# Patient Record
Sex: FEMALE | Race: WHITE | NOT HISPANIC OR LATINO | Employment: OTHER | URBAN - METROPOLITAN AREA
[De-identification: names, ages, dates, MRNs, and addresses within clinical notes are randomized per-mention and may not be internally consistent; named-entity substitution may affect disease eponyms.]

---

## 2024-05-20 ENCOUNTER — HOSPITAL ENCOUNTER (EMERGENCY)
Facility: HOSPITAL | Age: 84
Discharge: HOME | End: 2024-05-20
Attending: EMERGENCY MEDICINE
Payer: MEDICARE

## 2024-05-20 VITALS
WEIGHT: 159 LBS | BODY MASS INDEX: 30.02 KG/M2 | TEMPERATURE: 99 F | SYSTOLIC BLOOD PRESSURE: 158 MMHG | DIASTOLIC BLOOD PRESSURE: 83 MMHG | OXYGEN SATURATION: 98 % | HEIGHT: 61 IN | RESPIRATION RATE: 15 BRPM | HEART RATE: 72 BPM

## 2024-05-20 DIAGNOSIS — S00.83XA HEMATOMA OF OCCIPITAL SURFACE OF HEAD, INITIAL ENCOUNTER: ICD-10-CM

## 2024-05-20 DIAGNOSIS — S00.03XA SCALP HEMATOMA, INITIAL ENCOUNTER: ICD-10-CM

## 2024-05-20 DIAGNOSIS — Z79.01 LONG TERM CURRENT USE OF ANTICOAGULANT: ICD-10-CM

## 2024-05-20 DIAGNOSIS — S09.90XA MINOR HEAD INJURY WITHOUT LOSS OF CONSCIOUSNESS, INITIAL ENCOUNTER: ICD-10-CM
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
## 2024-05-20 PROCEDURE — 99284 EMERGENCY DEPT VISIT MOD MDM: CPT | Mod: 25

## 2024-05-20 PROCEDURE — G0390 TRAUMA RESPONS W/HOSP CRITI: HCPCS

## 2024-05-21 NOTE — ED PROVIDER NOTES
Encounter Date: 5/20/2024    SCRIBE #1 NOTE: I, Angelia Barry, am scribing for, and in the presence of,  Levi Tam MD. I have scribed the following portions of the note - Other sections scribed: HPI, ROS, PE.       History   No chief complaint on file.    84 year old female with an unknown pmhx presents to the ED via EMS from [facility] for a fall that occurred PTA.  The patient reports pain in the left occipital scalp area.  The patient denies any nausea, vomiting, or pain anywhere else.  Per EMS, the patient did not lose consciousness.  They report that the patient was complaining of pain in the left occipital scalp area en route.  They report that the patient is on Xarelto.  They report that the patient has a GCS of 14 at baseline.  A C-collar was placed upon arrival.    The history is provided by the patient and the EMS personnel. No  was used.     Review of patient's allergies indicates:  No Known Allergies  No past medical history on file.  No past surgical history on file.  No family history on file.     Review of Systems   Constitutional:  Negative for fever.   HENT:  Negative for sore throat.         Pain to the L occipital scalp   Respiratory:  Negative for shortness of breath.    Cardiovascular:  Negative for chest pain.   Gastrointestinal:  Negative for nausea.   Genitourinary:  Negative for dysuria.   Musculoskeletal:  Negative for back pain.   Skin:  Negative for rash.   Neurological:  Negative for weakness.   Hematological:  Does not bruise/bleed easily.       Physical Exam     Initial Vitals   BP Pulse Resp Temp SpO2   05/20/24 1907 05/20/24 1907 05/20/24 1907 05/20/24 1907 05/20/24 1858   (!) 181/93 79 19 98.9 °F (37.2 °C) 99 %      MAP       --                Physical Exam    Nursing note and vitals reviewed.  Constitutional: She appears well-developed and well-nourished. Airway: Normal. Cervical collar in place.   HENT:   Head: Normocephalic.   Right Ear:  External ear normal.   Left Ear: External ear normal.   Hematoma to the left occipital area    Eyes: Conjunctivae and EOM are normal. Pupils are equal, round, and reactive to light.   Cardiovascular:  Normal rate, regular rhythm, normal heart sounds and intact distal pulses.           Pulmonary/Chest: Breath sounds normal.   Abdominal: Abdomen is soft. Bowel sounds are normal.   Musculoskeletal:      Cervical back: No tenderness.     Neurological: She is alert and oriented to person, place, and time. She displays tremor (resting).   Skin: Skin is warm and dry. Capillary refill takes less than 2 seconds.   Psychiatric: She has a normal mood and affect. Her behavior is normal. Judgment and thought content normal.         ED Course   Procedures  Labs Reviewed   CBC W/ AUTO DIFFERENTIAL    Narrative:     The following orders were created for panel order CBC auto differential.  Procedure                               Abnormality         Status                     ---------                               -----------         ------                     CBC with Differential[[id number]]                                                        Please view results for these tests on the individual orders.   COMPREHENSIVE METABOLIC PANEL   PROTIME-INR   APTT   LACTIC ACID, PLASMA   URINALYSIS, REFLEX TO URINE CULTURE   DRUG SCREEN, URINE (BEAKER)   ALCOHOL,MEDICAL (ETHANOL)   CBC WITH DIFFERENTIAL   TYPE & SCREEN          Imaging Results              CT Cervical Spine Without Contrast (Final result)  Result time 05/20/24 19:29:24      Final result by Jeannie Montes MD (05/20/24 19:29:24)                   Impression:      Evaluation limited by motion artifact.  No definite acute fracture identified.      Electronically signed by: Jeannie Montes  Date:    05/20/2024  Time:    19:29               Narrative:    EXAMINATION:  CT CERVICAL SPINE WITHOUT CONTRAST    CLINICAL HISTORY:  Trauma;    TECHNIQUE:  Noncontrast CT images  of the cervical spine. Axial, coronal, and sagittal reformatted images were obtained. Dose length product is 1192 mGycm. Automatic exposure control, adjustment of mA/kV or iterative reconstruction technique was used to limit radiation dose.    COMPARISON:  None    FINDINGS:  The cervical spine is visualized through the level of T1.    Evaluation is limited by motion artifact.  There is no definite acute fracture identified.  There is reversal of normal cervical lordosis.  There are multilevel degenerative changes with disc height loss, marginal formation facet arthropathy.  There is no paraspinal hematoma.                                       CT Head Without Contrast (Final result)  Result time 05/20/24 19:26:41      Final result by Jeannie Montes MD (05/20/24 19:26:41)                   Impression:      1. No acute intracranial abnormality.  2. Chronic microvascular ischemic changes.      Electronically signed by: Jeannie Montes  Date:    05/20/2024  Time:    19:26               Narrative:    EXAMINATION:  CT HEAD WITHOUT CONTRAST    CLINICAL HISTORY:  Trauma;    TECHNIQUE:  Axial scans were obtained from skull base to the vertex.    Coronal and sagittal reconstructions obtained from the axial data.    Automatic exposure control was utilized to limit radiation dose.    Contrast: None    Radiation Dose:    Total DLP: 1192 mGy*cm    COMPARISON:  None    FINDINGS:  There is no acute intracranial hemorrhage or edema. The gray-white matter differentiation is preserved.  Patchy hypodensities in the subcortical and periventricular white matter likely represent chronic microvascular ischemic changes.    There is no mass effect or midline shift.  There is diffuse parenchymal volume loss.  The basal cisterns are patent. There is no abnormal extra-axial fluid collection.  Carotid and vertebral artery calcifications are noted.    The calvarium and skull base are intact.  There is trace scattered paranasal sinus  mucosal thickening.                                       Medications   Tdap (BOOSTRIX) vaccine injection 0.5 mL (has no administration in time range)     Medical Decision Making  The differential diagnoses includes but is not limited to: contusion, intracranial hemorrhage, fracture.      Amount and/or Complexity of Data Reviewed  Independent Historian:      Details:  Per EMS, the patient did not lose consciousness.  They report that the patient was complaining of pain in the left occipital scalp area en route.  They report that the patient is on Xarelto.  They report that the patient has a GCS of 14 at baseline.   Labs: ordered. Decision-making details documented in ED Course.  Radiology: ordered. Decision-making details documented in ED Course.            Scribe Attestation:   Scribe #1: I performed the above scribed service and the documentation accurately describes the services I performed. I attest to the accuracy of the note.    Attending Attestation:           Physician Attestation for Scribe:  Physician Attestation Statement for Scribe #1: I, Levi Tam MD, reviewed documentation, as scribed by Angelia Barry in my presence, and it is both accurate and complete.                                    Clinical Impression:  Final diagnoses:  [W19.XXXA] Fall, initial encounter (Primary)  [S00.03XA] Scalp hematoma, initial encounter  [S09.90XA] Minor head injury without loss of consciousness, initial encounter  [Z79.01] Long term current use of anticoagulant          ED Disposition Condition    Discharge Stable          ED Prescriptions    None       Follow-up Information       Follow up With Specialties Details Why Contact Info    Follow up with your primary MD in 3-5 days if not improved.  Return to ED for worsening symptoms.                 Levi Tam MD  05/20/24 1952

## 2024-05-21 NOTE — CONSULTS
"   Trauma Surgery   Activation Note    Patient Name: Robe Gamble  MRN: [id number]   YOB: 1940  Date: 05/20/2024    LEVEL 2 TRAUMA     Subjective:   History of present illness: Patient is an approximately 84 year old female presents s/p unwitnessed fall with hematoma to head. GCS 14 which is baseline on Xarelto    Primary Survey:  A patent   B Wesley breath sounds    C 2+ radial     D GCS 14(E 4, V 4, M 6)    E exposed, log-rolled and examined (see below)   F See below     VITAL SIGNS: 24 HR MIN & MAX LAST   Temp  Min: 98.9 °F (37.2 °C)  Max: 98.9 °F (37.2 °C)  98.9 °F (37.2 °C)   BP  Min: 181/93  Max: 181/93  (!) 181/93    Pulse  Min: 79  Max: 79  79    Resp  Min: 19  Max: 19  19    SpO2  Min: 98 %  Max: 99 %  98 %      HT: 5' 1" (154.9 cm)  WT: 72.1 kg (159 lb)  BMI: 30.1     FAST: deferred    Medications/transfusions received en-route: -  Medications/transfusions received in trauma bay: -    Scheduled Meds:   DIPH,PERTUSS(ACELL),TET VACCINE (ADULT)(BOOSTRIX,ADACEL)  0.5 mL Intramuscular ED 1 Time     Continuous Infusions:  PRN Meds:    ROS: 12 point ROS negative except as stated in HPI    Allergies: Unknown  PMH: Unknown  PSH: Unknown  Social history: Unknown  Objective:   Secondary Survey:   General: Well developed, well nourished, no acute distress, AAOx2  Neuro: CNII-XII grossly intact resting tremor to hands and feet   HEENT: posterior scalp hematoma PERRL, cervical collar placed  CV:  RRR  Pulse: 2+ RP b/l, 1+ DP b/l   Resp/chest:  Non-labored breathing, satting on room air  GI:  Abdomen soft, non-tender, non-distended  Extremities: Moves all 4 spontaneously and purposefully, no obvious gross deformities.  Back/Spine: No bony TTP, no palpable step offs or deformities.   Skin/wounds:  Warm, well perfused,   Psych: Normal mood and affect.    Labs:  deferred    Imaging:  Imaging Results              CT Cervical Spine Without Contrast (Final result)  Result time 05/20/24 19:29:24      Final result " by Jeannie Montes MD (05/20/24 19:29:24)                   Impression:      Evaluation limited by motion artifact.  No definite acute fracture identified.      Electronically signed by: Jeannie Montes  Date:    05/20/2024  Time:    19:29               Narrative:    EXAMINATION:  CT CERVICAL SPINE WITHOUT CONTRAST    CLINICAL HISTORY:  Trauma;    TECHNIQUE:  Noncontrast CT images of the cervical spine. Axial, coronal, and sagittal reformatted images were obtained. Dose length product is 1192 mGycm. Automatic exposure control, adjustment of mA/kV or iterative reconstruction technique was used to limit radiation dose.    COMPARISON:  None    FINDINGS:  The cervical spine is visualized through the level of T1.    Evaluation is limited by motion artifact.  There is no definite acute fracture identified.  There is reversal of normal cervical lordosis.  There are multilevel degenerative changes with disc height loss, marginal formation facet arthropathy.  There is no paraspinal hematoma.                                       CT Head Without Contrast (Final result)  Result time 05/20/24 19:26:41      Final result by Jeannie Montes MD (05/20/24 19:26:41)                   Impression:      1. No acute intracranial abnormality.  2. Chronic microvascular ischemic changes.      Electronically signed by: Jeannie Montes  Date:    05/20/2024  Time:    19:26               Narrative:    EXAMINATION:  CT HEAD WITHOUT CONTRAST    CLINICAL HISTORY:  Trauma;    TECHNIQUE:  Axial scans were obtained from skull base to the vertex.    Coronal and sagittal reconstructions obtained from the axial data.    Automatic exposure control was utilized to limit radiation dose.    Contrast: None    Radiation Dose:    Total DLP: 1192 mGy*cm    COMPARISON:  None    FINDINGS:  There is no acute intracranial hemorrhage or edema. The gray-white matter differentiation is preserved.  Patchy hypodensities in the subcortical and periventricular  white matter likely represent chronic microvascular ischemic changes.    There is no mass effect or midline shift.  There is diffuse parenchymal volume loss.  The basal cisterns are patent. There is no abnormal extra-axial fluid collection.  Carotid and vertebral artery calcifications are noted.    The calvarium and skull base are intact.  There is trace scattered paranasal sinus mucosal thickening.                                        Assessment & Plan:   84 year old female s/p unwitnessed fall    No acute traumatic injury  Dispo per ED     Lillian Vee [facility]   Trauma/Acute Care Surgery  [facility]  C: [phone]